# Patient Record
Sex: FEMALE | URBAN - METROPOLITAN AREA
[De-identification: names, ages, dates, MRNs, and addresses within clinical notes are randomized per-mention and may not be internally consistent; named-entity substitution may affect disease eponyms.]

---

## 2022-08-23 ENCOUNTER — OFFICE VISIT (OUTPATIENT)
Dept: URBAN - METROPOLITAN AREA CLINIC 6 | Facility: CLINIC | Age: 37
End: 2022-08-23
Payer: COMMERCIAL

## 2022-08-23 DIAGNOSIS — H04.203 UNSPECIFIED EPIPHORA, BILATERAL: ICD-10-CM

## 2022-08-23 DIAGNOSIS — Q11.1 ANOPHTHALMOS: Primary | ICD-10-CM

## 2022-08-23 PROCEDURE — 92285 EXTERNAL OCULAR PHOTOGRAPHY: CPT | Performed by: OPHTHALMOLOGY

## 2022-08-23 PROCEDURE — 92002 INTRM OPH EXAM NEW PATIENT: CPT | Performed by: OPHTHALMOLOGY

## 2022-08-23 ASSESSMENT — INTRAOCULAR PRESSURE: OS: 13

## 2022-08-23 NOTE — IMPRESSION/PLAN
Impression: Anophthalmos: Q11.1. Plan: Patient examined. Chart reviewed. Reason for referral: Anophthalmic right socket since age 3. Patient is now 39, and her current prosthesis fell out two weeks ago (that prosthesis was two years old.) There is socket incompetence due to lid malfunction, but volume is needed in the socket to assess appropriate Oculoplastic intervention. External photos taken today. Refer for prosthesis fitting ASAP- and if possible provide a temporary prosthesis. Needs prosthesis to be able to assess lid function. Patient is wearing protective glasses.

## 2022-08-23 NOTE — IMPRESSION/PLAN
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 Plan: Patient examined. Chart reviewed. Agenesis of punctae noted. Address after socket reconstruction.

## 2024-10-10 ENCOUNTER — OFFICE VISIT (OUTPATIENT)
Dept: URBAN - METROPOLITAN AREA CLINIC 6 | Facility: CLINIC | Age: 39
End: 2024-10-10
Payer: MEDICARE

## 2024-10-10 DIAGNOSIS — Q11.1 ANOPHTHALMOS: Primary | ICD-10-CM

## 2024-10-10 PROCEDURE — 99203 OFFICE O/P NEW LOW 30 MIN: CPT | Performed by: OPHTHALMOLOGY

## 2024-10-10 ASSESSMENT — INTRAOCULAR PRESSURE: OS: 15
